# Patient Record
Sex: MALE | Race: WHITE
[De-identification: names, ages, dates, MRNs, and addresses within clinical notes are randomized per-mention and may not be internally consistent; named-entity substitution may affect disease eponyms.]

---

## 2019-03-07 ENCOUNTER — HOSPITAL ENCOUNTER (EMERGENCY)
Dept: HOSPITAL 25 - ED | Age: 24
LOS: 1 days | Discharge: HOME | End: 2019-03-08
Payer: COMMERCIAL

## 2019-03-07 DIAGNOSIS — M62.838: Primary | ICD-10-CM

## 2019-03-07 DIAGNOSIS — Z88.1: ICD-10-CM

## 2019-03-07 DIAGNOSIS — R11.0: ICD-10-CM

## 2019-03-07 DIAGNOSIS — R91.1: ICD-10-CM

## 2019-03-07 DIAGNOSIS — R10.31: ICD-10-CM

## 2019-03-07 PROCEDURE — 81003 URINALYSIS AUTO W/O SCOPE: CPT

## 2019-03-07 PROCEDURE — 85025 COMPLETE CBC W/AUTO DIFF WBC: CPT

## 2019-03-07 PROCEDURE — 99282 EMERGENCY DEPT VISIT SF MDM: CPT

## 2019-03-07 PROCEDURE — 74177 CT ABD & PELVIS W/CONTRAST: CPT

## 2019-03-07 PROCEDURE — 36415 COLL VENOUS BLD VENIPUNCTURE: CPT

## 2019-03-07 PROCEDURE — 80053 COMPREHEN METABOLIC PANEL: CPT

## 2019-03-07 PROCEDURE — 86140 C-REACTIVE PROTEIN: CPT

## 2019-03-08 LAB
ALBUMIN SERPL BCG-MCNC: 5.1 G/DL (ref 3.2–5.2)
ALBUMIN/GLOB SERPL: 2 {RATIO} (ref 1–3)
ALP SERPL-CCNC: 53 U/L (ref 34–104)
ALT SERPL W P-5'-P-CCNC: 22 U/L (ref 7–52)
ANION GAP SERPL CALC-SCNC: 12 MMOL/L (ref 2–11)
AST SERPL-CCNC: 32 U/L (ref 13–39)
BASOPHILS # BLD AUTO: 0 10^3/UL (ref 0–0.2)
BUN SERPL-MCNC: 15 MG/DL (ref 6–24)
BUN/CREAT SERPL: 18.1 (ref 8–20)
CALCIUM SERPL-MCNC: 9.5 MG/DL (ref 8.6–10.3)
CHLORIDE SERPL-SCNC: 100 MMOL/L (ref 101–111)
EOSINOPHIL # BLD AUTO: 0 10^3/UL (ref 0–0.6)
GLOBULIN SER CALC-MCNC: 2.6 G/DL (ref 2–4)
GLUCOSE SERPL-MCNC: 116 MG/DL (ref 70–100)
HCO3 SERPL-SCNC: 25 MMOL/L (ref 22–32)
HCT VFR BLD AUTO: 50 % (ref 42–52)
HGB BLD-MCNC: 16.8 G/DL (ref 14–18)
LYMPHOCYTES # BLD AUTO: 2 10^3/UL (ref 1–4.8)
MCH RBC QN AUTO: 30 PG (ref 27–31)
MCHC RBC AUTO-ENTMCNC: 34 G/DL (ref 31–36)
MCV RBC AUTO: 90 FL (ref 80–94)
MONOCYTES # BLD AUTO: 0.6 10^3/UL (ref 0–0.8)
NEUTROPHILS # BLD AUTO: 9.4 10^3/UL (ref 1.5–7.7)
NRBC # BLD AUTO: 0 10^3/UL
NRBC BLD QL AUTO: 0
PLATELET # BLD AUTO: 296 10^3/UL (ref 150–450)
POTASSIUM SERPL-SCNC: 3.4 MMOL/L (ref 3.5–5)
PROT SERPL-MCNC: 7.7 G/DL (ref 6.4–8.9)
RBC # BLD AUTO: 5.57 10^6/UL (ref 4–5.4)
SODIUM SERPL-SCNC: 137 MMOL/L (ref 135–145)
WBC # BLD AUTO: 12 10^3/UL (ref 3.5–10.8)

## 2019-03-08 NOTE — ED
GI/ HPI





- HPI Summary


HPI Summary: 





This pt is a 22 y[facility] since 20:00 

yesterday, 3/7/19. Pt reports he had sudden onset of pain above his right 

testicle last night. He describes the pain "like something pulled." Pt 

additionally notes he feels nauseous. Denies vomiting. Denies any fever, chills

, dysuria, hematuria. Pt denies difficulty urinating. 


Denies any PMHx. 





- History of Current Complaint


Chief Complaint: EDUrogenitalProblems


Time Seen by Provider: 03/08/19 00:44


Stated Complaint: NAUSEA/DISCOMFORT/MILD PAIN IN GROIN PER PT


Hx Obtained From: Patient


Onset/Duration: Started Hours Ago, Still Present


Timing: Lasting Hours


Pain Intensity: 2


Location of Pain: Groin - right


Pain Characteristics: Other: - "like something pulled"


Associated Signs and Symptoms: Positive: Nausea.  Negative: Vomiting, Fever, 

Hematuria, Dysuria, Chills


Aggravating Factor(s): Nothing


Alleviating Factor(s): Nothing





- Allergy/Home Medications


Allergies/Adverse Reactions: 


 Allergies











Allergy/AdvReac Type Severity Reaction Status Date / Time


 


azithromycin [From Zithromax] Allergy  Unknown Verified 03/07/19 23:57





   Reaction  





   Details  














PMH/Surg Hx/FS Hx/Imm Hx


Endocrine/Hematology History: 


   Denies: Hx Diabetes


Cardiovascular History: 


   Denies: Hx Hypertension


Infectious Disease History: No


Infectious Disease History: 


   Denies: Traveled Outside the [place] in Last 30 Days





- Family History


Known Family History: 


   Negative: Cardiac Disease, Hypertension, Diabetes





- Social History


Alcohol Use: None


Substance Use Type: Reports: None


Smoking Status (MU): Never Smoked Tobacco





Review of Systems


Negative: Fever, Chills


Positive: Nausea.  Negative: Vomiting


Genitourinary: Other - POS: right groin pain


Negative: dysuria, hematuria


All Other Systems Reviewed And Are Negative: Yes





Physical Exam





- Summary


Physical Exam Summary: 





VITAL SIGNS: Reviewed.


GENERAL: Patient is a well-developed and nourished male who is lying 

comfortable in the stretcher. Patient is not in any acute respiratory distress.


HEAD AND FACE: No signs of trauma. No ecchymosis, hematomas or skull 

depressions. No sinus tenderness.


EYES: PERRLA, EOMI x 2, No injected conjunctiva, no nystagmus.


EARS: Hearing grossly intact. Ear canals and tympanic membranes are within 

normal limits.


MOUTH: Oropharynx within normal limits.


NECK: Supple, trachea is midline, no adenopathy, no JVD, no carotid bruit, no c-

spine tenderness, neck with full ROM.


CHEST: Symmetric, no tenderness at palpation


LUNGS: Clear to auscultation bilaterally. No wheezing or crackles.


CVS: Regular rate and rhythm, S1 and S2 present, no murmurs or gallops 

appreciated.


ABDOMEN: Soft, non-tender. No signs of distention. No rebound no guarding, and 

no masses palpated. Bowel sounds are normal.


: Tenderness over the inguinal ligament medially. Testicular exam: Normal. No 

tenderness. No swelling. No elevation. No redness. 


EXTREMITIES: FROM in all major joints, no edema, no cyanosis or clubbing.


NEURO: Alert and oriented x 3. No acute neurological deficits. Speech is normal 

and follows commands.


SKIN: Dry and warm


Triage Information Reviewed: Yes


Vital Signs On Initial Exam: 


 Initial Vitals











Temp Pulse Resp BP Pulse Ox


 


 98.7 F   120   20   176/103   99 


 


 03/07/19 23:50  03/07/19 23:50  03/07/19 23:50  03/07/19 23:50  03/07/19 23:50











Vital Signs Reviewed: Yes





Diagnostics





- Vital Signs


 Vital Signs











  Temp Pulse Resp BP Pulse Ox


 


 03/07/19 23:50  98.7 F  120  20  176/103  99














- Laboratory


Result Diagrams: 


 03/08/19 01:05





 03/08/19 01:05


Lab Statement: Any lab studies that have been ordered have been reviewed, and 

results considered in the medical decision making process.





- CT


  ** Abdomen/Pelvis CT


CT Interpretation Completed By: Radiologist


Summary of CT Findings: IMPRESSION:  1. 3 mm left lower lobe nodule. Fleischner 

guidelines do not apply to the patient's of this age.  2. Low aortic/SMA angle 

with narrowing of the left renal vein and proximal distention which is 

nonspecific but may be seen with nutcracker syndrome although no significant 

collateralization is noted.  3. Otherwise negative CT abdomen/pelvis. No distal 

ureteral calculi or obstructive uropathy.  Dr. Roy has reviewed this report.





Re-Evaluation





- Re-Evaluation


  ** First Eval


Re-Evaluation Time: 05:06


Comment: Reviewed lab and CT results with the pt. He will be discharged home 

with follow up from PCP.





GIGU Course/Dx





- Course


Assessment/Plan: Pt is a 22 y/o male who presents with right groin pain since 20

:00 yesterday, 3/7/19. Pt reports he had sudden onset of pain above his right 

testicle last night. He describes the pain "like something pulled." Pt 

additionally notes he feels nauseous. Denies vomiting. Denies any fever, chills

, dysuria, hematuria. Pt denies difficulty urinating.  Denies any PMHx.  Labs 

show WBC of 12, glucose of 116, total bilirubin of 1.6.  In the ED course the 

pt was given Toradol for the pain.  Abdomen/pelvis CT shows 1. 3 mm left lower 

lobe nodule. Fleischner guidelines do not apply to the patient's of this age.  

2. Low aortic/SMA angle with narrowing of the left renal vein and proximal 

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 

although no significant collateralization is noted.  3. Otherwise negative CT 

abdomen/pelvis. No distal ureteral calculi or obstructive uropathy.  Urinalysis 

was obtained.  Pt will be discharged home with follow up from his PCP. He was 

given a prescription for Motrin.  He was instructed to return to the ED for any 

new or worsening symptoms.





- Diagnoses


Provider Diagnoses: 


 Muscle spasm








Discharge





- Sign-Out/Discharge


Documenting (check all that apply): Patient Departure - Discharge home


Patient Received Moderate/Deep Sedation with Procedure: No





- Discharge Plan


Condition: Stable


Disposition: HOME


Prescriptions: 


Ibuprofen TAB* [Motrin TAB* 800 MG] 800 mg PO Q6H PRN #30 tab


 PRN Reason: Pain


Patient Education Materials:  Muscle Spasm (ED)


Referrals: 


[facility] @  [Outside]


Additional Instructions: 


PLEASE FOLLOW UP WITH YOUR PRIMARY CARE PROVIDER IN 1-2 DAYS. 





RETURN TO THE EMERGENCY DEPARTMENT FOR CHANGING OR WORSENING SYMPTOMS. 





- Attestation Statements


Document Initiated by Scribe: Yes


Documenting Scribe: Peyton Drummond


Provider For Whom Scribe is Documenting (Include Credential): Yarelis Roy MD


Scribe Attestation: 


Peyton SILVERIO, scribed for Yarelis Roy MD on 03/08/19 at 0508. 


Status of Scribe Document: Ready